# Patient Record
Sex: MALE | Race: WHITE | NOT HISPANIC OR LATINO | Employment: FULL TIME
[De-identification: names, ages, dates, MRNs, and addresses within clinical notes are randomized per-mention and may not be internally consistent; named-entity substitution may affect disease eponyms.]

---

## 2023-04-25 ENCOUNTER — TRANSCRIBE ORDERS (OUTPATIENT)
Dept: OTHER | Age: 43
End: 2023-04-25

## 2023-04-25 ENCOUNTER — TELEPHONE (OUTPATIENT)
Dept: TRANSPLANT | Facility: CLINIC | Age: 43
End: 2023-04-25

## 2023-04-25 DIAGNOSIS — C94.40 ACUTE PANMYELOSIS WITH MYELOFIBROSIS NOT HAVING ACHIEVED REMISSION (H): Primary | ICD-10-CM

## 2023-04-25 DIAGNOSIS — D75.81 MYELOFIBROSIS (H): Primary | ICD-10-CM

## 2023-05-21 ENCOUNTER — HEALTH MAINTENANCE LETTER (OUTPATIENT)
Age: 43
End: 2023-05-21

## 2023-05-23 ENCOUNTER — CARE COORDINATION (OUTPATIENT)
Dept: TRANSPLANT | Facility: CLINIC | Age: 43
End: 2023-05-23
Payer: COMMERCIAL

## 2023-05-23 DIAGNOSIS — C94.40 ACUTE PANMYELOSIS WITH MYELOFIBROSIS NOT HAVING ACHIEVED REMISSION (H): Primary | ICD-10-CM

## 2023-05-23 NOTE — PROGRESS NOTES
[facility]  RN Coordinator Pre-Visit Documentation      Venkatesh Green is a 42 year old adult who has been referred to the [facility] for hematopoietic cell transplant or immune effector cell therapy.      Referring MD Name: Suzan Ramirez, telephone [phone]    Reason for referral: allo HSCT    Link to BMT & CT Program Algorithms      For allos only:    Previous HLA typing? No    Previous formal donor search? No    PRA needed? Yes    CMV IGG needed? Yes    and ABO needed? Yes    Potential family donors to type? No, half-sister with muscle tissue disease    Need URD consents? Yes    For CAR-T Candidates Only:    Orders placed for virologies: Yes      All relevant clinical notes, labs, imaging, and pathology may be reviewed in Epic Bookmarks under name: Felisha Ariza RN      Patient Care Team    No active team members.           Felisha Ariza RN

## 2023-05-29 LAB
ABO/RH(D): NORMAL
ANTIBODY SCREEN: NEGATIVE
SPECIMEN EXPIRATION DATE: NORMAL

## 2023-05-30 ENCOUNTER — OFFICE VISIT (OUTPATIENT)
Dept: TRANSPLANT | Facility: CLINIC | Age: 43
End: 2023-05-30
Attending: INTERNAL MEDICINE
Payer: COMMERCIAL

## 2023-05-30 VITALS
DIASTOLIC BLOOD PRESSURE: 84 MMHG | BODY MASS INDEX: 49.44 KG/M2 | SYSTOLIC BLOOD PRESSURE: 117 MMHG | HEIGHT: 67 IN | TEMPERATURE: 98 F | OXYGEN SATURATION: 98 % | WEIGHT: 315 LBS | HEART RATE: 89 BPM | RESPIRATION RATE: 16 BRPM

## 2023-05-30 DIAGNOSIS — C94.40 ACUTE PANMYELOSIS WITH MYELOFIBROSIS NOT HAVING ACHIEVED REMISSION (H): ICD-10-CM

## 2023-05-30 DIAGNOSIS — Z71.9 VISIT FOR COUNSELING: Primary | ICD-10-CM

## 2023-05-30 DIAGNOSIS — C94.40 ACUTE PANMYELOSIS WITH MYELOFIBROSIS NOT HAVING ACHIEVED REMISSION (H): Primary | ICD-10-CM

## 2023-05-30 PROBLEM — D75.81 MYELOFIBROSIS (H): Status: ACTIVE | Noted: 2023-04-24

## 2023-05-30 LAB
CMV IGG SERPL IA-ACNC: 5.5 U/ML
CMV IGG SERPL IA-ACNC: ABNORMAL

## 2023-05-30 PROCEDURE — 86828 HLA CLASS I&II ANTIBODY QUAL: CPT | Performed by: INTERNAL MEDICINE

## 2023-05-30 PROCEDURE — 99417 PROLNG OP E/M EACH 15 MIN: CPT | Performed by: INTERNAL MEDICINE

## 2023-05-30 PROCEDURE — 81378 HLA I & II TYPING HR: CPT | Performed by: INTERNAL MEDICINE

## 2023-05-30 PROCEDURE — 86850 RBC ANTIBODY SCREEN: CPT | Performed by: INTERNAL MEDICINE

## 2023-05-30 PROCEDURE — 86644 CMV ANTIBODY: CPT | Performed by: INTERNAL MEDICINE

## 2023-05-30 PROCEDURE — 99213 OFFICE O/P EST LOW 20 MIN: CPT | Performed by: INTERNAL MEDICINE

## 2023-05-30 PROCEDURE — 99205 OFFICE O/P NEW HI 60 MIN: CPT | Performed by: INTERNAL MEDICINE

## 2023-05-30 PROCEDURE — 36415 COLL VENOUS BLD VENIPUNCTURE: CPT | Performed by: INTERNAL MEDICINE

## 2023-05-30 RX ORDER — HYDROXYUREA 500 MG/1
500 CAPSULE ORAL
COMMUNITY
Start: 2023-04-24 | End: 2024-04-26

## 2023-05-30 RX ORDER — DULOXETIN HYDROCHLORIDE 30 MG/1
CAPSULE, DELAYED RELEASE ORAL
COMMUNITY
Start: 2022-11-16

## 2023-05-30 RX ORDER — IBUPROFEN 200 MG
400 TABLET ORAL
COMMUNITY

## 2023-05-30 ASSESSMENT — PAIN SCALES - GENERAL: PAINLEVEL: NO PAIN (0)

## 2023-05-30 NOTE — NURSING NOTE
Patient labs drawn in clinic via venipuncture. Patient tolerated well and was discharged. Specimen(s) sent to first floor lab for processing. See flowsheet for details.      Sergey Moreno on 5/30/2023 at 10:04 AM

## 2023-05-30 NOTE — PROGRESS NOTES
BMT / Cell Therapy Consultation      Venkatesh Green is a 42 year old adult referred by Dr. Oconnor for post ET-mF.      Disease presentation and baseline characteristics:    Diagnosis: ?Post ET- vs. primary MF, Moderate reticulin fibrosis (MF-2). No increase in myeloblasts, positive for CALR mutation. NGS showed mutation in ASXL1 CARL2 and TET2.  Cytogenetics 46, XX. Flow cytometry showed no increased blasts.   GIPSS Intermediate 1, MIPSS70+ 4 (ASXL1 and constitutional symptoms)    Onc history:   42-year-old transgender female to male who was diagnosed in the past with essential thrombocythemia when he was about 17 or 18 years old. He said that he had a bone marrow biopsy [place] and he was told that he has essential thrombocythemia. He was on hydroxyurea for almost 4-5 years then anagrelide, but then after he left his parent's house he stopped taking it. He was off of the hydroxyurea as his counts were holding within the normal. Last time he saw Dr. Trujillo was back in 2017. By reviewing labs the patient had JAK2 V617F checked in the past and it was negative.     Recently he has been having pain in the left upper quadrant, fatigue extreme, night sweats for one year. He presented to the ED on April 3, 2023, with abdominal pain and he had CBC done which came back showing platelets 265,000, normal hemoglobin at 13.5 and normal WBC at 7.4.     He had a CT of the abdomen and pelvis which showed splenomegaly that measured about 20 cm and there was subcentimeter hypoattenuating lesion in the inferior right hepatic lobe that was felt to be most likely a small cyst. There was 1.9 cm peripherally calcified nodule medial to the left adrenal gland which has a benign appearance. There were no retroperitoneal or inguinal lymph nodes, and the patient was referred for consultation.     Bone marrow biopsy on April 11, 2023  A. PERIPHERAL BLOOD SMEAR MORPHOLOGY  --LEUKOERYTHROBLASTIC REACTION  WITH RARE CIRCULATING MYELOID-LINEAGE BLASTS     B. BONE MARROW ASPIRATE, CORE BIOPSY AND PARTICLE BLOCK  --MYELOPROLIFERATIVE NEOPLASM, MOST CONSISTENT WITH PRIMARY MYELOFIBROSIS, OVERT STAGE (SEE COMMENT)  --MODERATE RETICULIN FIBROSIS (MF-2)  --NO INCREASE IN MYELOBLASTS  --POSITIVE FOR CALR MUTATION  -- Normal immunophenotyping results.  No monotypic B-cell   population or increase in blasts identified.   -- Cytogenetics 46, XX. Flow cytometry showed no blasts seen  -- NGS:   1. ASXL1:  Chr20(GRCh37):g.[id number]>T;   NM_015338.5(ASXL1):c.4243C>T; p.Nfm1411* (27%)     2. CALR: Chr19(GRCh37):g.[id number]_13054625del;   NM_004343.3(CALR):c.1101_1152del; p.Bbf047Jmkrb*45 (56%)     3. TET2: Chr4(GRCh37):g.740603355ydc;   NM_001127208.2(TET2):c.685dup; p.Nhg390Xrbqd*25 (8%)     PNH:  ET, MF as above  Appendectomy  Overweight    FH:  Mother with history of blood clots, fibromyalgia COPD and dementia.  Has half a sister.  No known hematological malignancies in the family.    SH:  [employer] , 1/4 pack uyear for 13 years quit 2/2023, never heavy drinker was occasional drinker     Date Treatment Name Response Side Effects / Toxicities    Hydrea and anagrelide in the past.  Planned to start Jakafi.                              HPI:  Please see my entry above for hematologic history.    Patient is here for new transplant visit.  Reports mild intermittent left upper quadrant pain.  Severe fatigue for the past 2 years and intermittent not drenching night sweats for 1 year.  Denies any bleeding.  Denies any chest pain or shortness of breath.  Trying to be more physically active, has a 2-1/2-year-old son.    ASSESSMENT AND PLAN:  This is a pleasant 42-year-old male with history of ?Post ET- vs. primary MF, Moderate reticulin fibrosis (MF-2). No increase in myeloblasts, positive for CALR mutation. NGS showed mutation in ASXL1 CARL2 and TET2. Cytogenetics 46, XX.GIPSS Intermediate 1, MIPSS70+ 4 (ASXL1 and  constitutional symptoms), NORMAL counts, referred for consideration of consolidation with allogenic hematopoietic cell transplantation.     I discussed with the patient the implications and prognosis of his disease and the risks and benefits of proceeding with an allogeneic stem cell transplantation depending on the risk scores especially the molecular ones. At this time, he does not have high risk disease for indications of immediate transplant but we discussed that this will be in his future as the only potentially curative approach for the underlying myelofibrosis, and we further discussed the risk and benefit for transplant and leukemictransformation at the different risk stratification models.      I discussed with the patient the timeline of an allogeneic bone marrow transplant procedure, rationale, risks and benefits. After discharge, patients are required to live near the transplant center for frequent visits, lab tests, and medication adjustments for many months.  Patients must have caregiver support at home to help with self care. Side effects of the preparative regimen can include fatigue, nausea, vomiting, mouth sores, diarrhea, lung damage, liver damage, hair loss, and death.  Most of these are reversible, but occasionally, there are permanent.  Patients are usually infertile after transplant. There are long term risks of increased secondary cancers, including blood cancers as well.  There is also the possibility of engraftment failure. We discussed graft vs host disease as a short and long term risk that can affect many different parts of the body, including the skin, gut, and liver leading to rashes, mouth sores, diarrhea, nausea, jaundice.  This can be an acute or chronic complication and some patients will develop chronic graft versus host disease, and can be fatal.  Patients have to take immunosuppressive therapies possibly for many months after transplant. There are also risks of infection that  "occur both with the transplant and with immunosuppression.  Medications are used to help reduce the risk of severe infection.  We discussed that overall risks of mortality 30-40% to be further determined based on workup testing results.  We discussed risk of acute GVHD and chronic GVHD, that additionally depends on donor source, and that on our protocol with posttransplant cyclophosphamide as nfelc-jtkgni-mupn disease prophylaxis the results of 10% risk of severe acute vqsky-yjmhdz-eitl disease or immunosuppression requiring chronic rhfnh-zzdbbj-ugqo disease.    Recommendations:   - Start Jakafi locally for symptoms control  - MUD search today  - Re-assess risk vs benefit in 4-6 months with repeat BMB locally as well or earlier as clinically indicated.    I spent 90 minutes in the care of this patient today, which included time necessary for preparation for the visit, obtaining history, ordering medications/tests/procedures as medically indicated, review of pertinent medical literature, counseling of the patient, communication of recommendations to the care team, and documentation time.    Maricarmen Gaytan MD        ROS:    10 point ROS neg other than the symptoms noted above in the HPI.          Social History     Tobacco Use     Smoking status: Former     Types: Cigarettes     Smokeless tobacco: Never   Substance Use Topics     Alcohol use: Not Currently     Drug use: Never         No Known Allergies     Current Outpatient Medications   Medication Sig Dispense Refill     DULoxetine (CYMBALTA) 30 MG capsule        hydroxyurea (HYDREA) 500 MG capsule Take 500 mg by mouth       ibuprofen (ADVIL/MOTRIN) 200 MG tablet Take 400 mg by mouth           Physical Exam:     Vital Signs: /84 (BP Location: Right arm, Patient Position: Chair, Cuff Size: Adult Large)   Pulse 89   Temp 98  F (36.7  C) (Oral)   Resp 16   Ht 1.69 m (5' 6.54\")   Wt 148.6 kg (327 lb 11.2 oz)   SpO2 98%   BMI 52.04 kg/m          KPS:  " 80    Venkatesh understood the above assessment and recommendations.  Multiple questions answered.  No barriers to learning identified.      Known issues that I take into account for medical decisions, with salient changes to the plan considering these complexities noted above.    Patient Active Problem List   Diagnosis     Myelofibrosis (H)       ------------------------------------------------------------------------------------------------------------------------------------------------    Patient Care Team       Relationship Specialty Notifications Start End    Kristine Cruz CNP PCP - General   5/30/23     Phone: [phone] Fax: [phone]         [address] [place] [id number]

## 2023-05-30 NOTE — LETTER
5/30/2023         RE: Venkatesh Green  [address]  [address]  [place] [address]             BMT / Cell Therapy Consultation      Venkatesh Green is a 42 year old adult referred by Dr. Oconnor for post ET-mF.      Disease presentation and baseline characteristics:    Diagnosis: ?Post ET- vs. primary MF, Moderate reticulin fibrosis (MF-2). No increase in myeloblasts, positive for CALR mutation. NGS showed mutation in ASXL1 CARL2 and TET2.  Cytogenetics 46, XX. Flow cytometry showed no increased blasts.   GIPSS Intermediate 1, MIPSS70+ 4 (ASXL1 and constitutional symptoms)    Onc history:   42-year-old transgender female to male who was diagnosed in the past with essential thrombocythemia when he was about 17 or 18 years old. He said that he had a bone marrow biopsy done at that time in a different state and he was told that he has essential thrombocythemia. He was on hydroxyurea for almost 4-5 years then anagrelide, but then after he left his parent's house he stopped taking it. He was off of the hydroxyurea as his counts were holding within the normal. Last time he saw Dr. Trujillo was back in 2017. By reviewing labs the patient had JAK2 V617F checked in the past and it was negative.     Recently he has been having pain in the left upper quadrant, fatigue extreme, night sweats for one year. He presented to the ED on April 3, 2023, with abdominal pain and he had CBC done which came back showing platelets 265,000, normal hemoglobin at 13.5 and normal WBC at 7.4.     He had a CT of the abdomen and pelvis which showed splenomegaly that measured about 20 cm and there was subcentimeter hypoattenuating lesion in the inferior right hepatic lobe that was felt to be most likely a small cyst. There was 1.9 cm peripherally calcified nodule medial to the left adrenal gland which has a benign appearance. There were no retroperitoneal or inguinal lymph nodes, and the patient was referred for consultation.     Bone  marrow biopsy on April 11, 2023  A. PERIPHERAL BLOOD SMEAR MORPHOLOGY  --LEUKOERYTHROBLASTIC REACTION WITH RARE CIRCULATING MYELOID-LINEAGE BLASTS     B. BONE MARROW ASPIRATE, CORE BIOPSY AND PARTICLE BLOCK  --MYELOPROLIFERATIVE NEOPLASM, MOST CONSISTENT WITH PRIMARY MYELOFIBROSIS, OVERT STAGE (SEE COMMENT)  --MODERATE RETICULIN FIBROSIS (MF-2)  --NO INCREASE IN MYELOBLASTS  --POSITIVE FOR CALR MUTATION  -- Normal immunophenotyping results.  No monotypic B-cell   population or increase in blasts identified.   -- Cytogenetics 46, XX. Flow cytometry showed no blasts seen  -- NGS:   1. ASXL1:  Chr20(GRCh37):g.01829907E>T;   NM_015338.5(ASXL1):c.4243C>T; p.Hbm7678* (27%)     2. CALR: Chr19(GRCh37):g.13054574_13054625del;   NM_004343.3(CALR):c.1101_1152del; p.Etg703Kgtje*45 (56%)     3. TET2: Chr4(GRCh37):g.979026672egv;   NM_001127208.2(TET2):c.685dup; p.Lth291Vngfp*25 (8%)     PNH:  ET, MF as above  Appendectomy  Overweight    FH:  Mother with history of blood clots, fibromyalgia COPD and dementia.  Has half a sister.  No known hematological malignancies in the family.    SH:  [employer] , 1/4 pack uyear for 13 years quit 2/2023, never heavy drinker was occasional drinker     Date Treatment Name Response Side Effects / Toxicities    Hydrea and anagrelide in the past.  Planned to start Jakafi.                              HPI:  Please see my entry above for hematologic history.    Patient is here for new transplant visit.  Reports mild intermittent left upper quadrant pain.  Severe fatigue for the past 2 years and intermittent not drenching night sweats for 1 year.  Denies any bleeding.  Denies any chest pain or shortness of breath.  Trying to be more physically active, has a 2-1/2-year-old son.    ASSESSMENT AND PLAN:  This is a pleasant 42-year-old male with history of ?Post ET- vs. primary MF, Moderate reticulin fibrosis (MF-2). No increase in myeloblasts, positive for CALR mutation. NGS showed  mutation in ASXL1 CARL2 and TET2. Cytogenetics 46, XX.GIPSS Intermediate 1, MIPSS70+ 4 (ASXL1 and constitutional symptoms), NORMAL counts, referred for consideration of consolidation with allogenic hematopoietic cell transplantation.     I discussed with the patient the implications and prognosis of his disease and the risks and benefits of proceeding with an allogeneic stem cell transplantation depending on the risk scores especially the molecular ones. At this time, he does not have high risk disease for indications of immediate transplant but we discussed that this will be in his future as the only potentially curative approach for the underlying myelofibrosis, and we further discussed the risk and benefit for transplant and leukemictransformation at the different risk stratification models.      I discussed with the patient the timeline of an allogeneic bone marrow transplant procedure, rationale, risks and benefits. After discharge, patients are required to live near the transplant center for frequent visits, lab tests, and medication adjustments for many months.  Patients must have caregiver support at home to help with self care. Side effects of the preparative regimen can include fatigue, nausea, vomiting, mouth sores, diarrhea, lung damage, liver damage, hair loss, and death.  Most of these are reversible, but occasionally, there are permanent.  Patients are usually infertile after transplant. There are long term risks of increased secondary cancers, including blood cancers as well.  There is also the possibility of engraftment failure. We discussed graft vs host disease as a short and long term risk that can affect many different parts of the body, including the skin, gut, and liver leading to rashes, mouth sores, diarrhea, nausea, jaundice.  This can be an acute or chronic complication and some patients will develop chronic graft versus host disease, and can be fatal.  Patients have to take  immunosuppressive therapies possibly for many months after transplant. There are also risks of infection that occur both with the transplant and with immunosuppression.  Medications are used to help reduce the risk of severe infection.  We discussed that overall risks of mortality 30-40% to be further determined based on workup testing results.  We discussed risk of acute GVHD and chronic GVHD, that additionally depends on donor source, and that on our protocol with posttransplant cyclophosphamide as rbxhr-sxhoyx-hbvh disease prophylaxis the results of 10% risk of severe acute qdubx-mmfcgy-bxsa disease or immunosuppression requiring chronic kirlp-rvbfzl-fzns disease.    Recommendations:   - Start Jakafi locally for symptoms control  - MUD search today  - Re-assess risk vs benefit in 4-6 months with repeat BMB locally as well or earlier as clinically indicated.    I spent 90 minutes in the care of this patient today, which included time necessary for preparation for the visit, obtaining history, ordering medications/tests/procedures as medically indicated, review of pertinent medical literature, counseling of the patient, communication of recommendations to the care team, and documentation time.    Maricarmen Gaytan MD        ROS:    10 point ROS neg other than the symptoms noted above in the HPI.          Social History     Tobacco Use    Smoking status: Former     Types: Cigarettes    Smokeless tobacco: Never   Substance Use Topics    Alcohol use: Not Currently    Drug use: Never         No Known Allergies     Current Outpatient Medications   Medication Sig Dispense Refill    DULoxetine (CYMBALTA) 30 MG capsule       hydroxyurea (HYDREA) 500 MG capsule Take 500 mg by mouth      ibuprofen (ADVIL/MOTRIN) 200 MG tablet Take 400 mg by mouth           Physical Exam:     Vital Signs: /84 (BP Location: Right arm, Patient Position: Chair, Cuff Size: Adult Large)   Pulse 89   Temp 98  F (36.7  C) (Oral)   Resp 16    "Ht 1.69 m (5' 6.54\")   Wt 148.6 kg (327 lb 11.2 oz)   SpO2 98%   BMI 52.04 kg/m          KPS:  80    Venkatesh understood the above assessment and recommendations.  Multiple questions answered.  No barriers to learning identified.      Known issues that I take into account for medical decisions, with salient changes to the plan considering these complexities noted above.    Patient Active Problem List   Diagnosis    Myelofibrosis (H)       ------------------------------------------------------------------------------------------------------------------------------------------------    Patient Care Team         Relationship Specialty Notifications Start End    Kristine Cruz CNP PCP - General   5/30/23     Phone: [phone] Fax: [phone]         [address] [place] [id number]              Maricarmen Gaytan MD  "

## 2023-05-30 NOTE — NURSING NOTE
"Oncology Rooming Note    May 30, 2023 8:05 AM   Venkatesh Green is a 42 year old adult who presents for:    Chief Complaint   Patient presents with     Oncology Clinic Visit     [facility] RETURN - MYELOFIBROSIS     Initial Vitals: /84 (BP Location: Right arm, Patient Position: Chair, Cuff Size: Adult Large)   Pulse 89   Temp 98  F (36.7  C) (Oral)   Resp 16   Ht 1.69 m (5' 6.54\")   Wt 148.6 kg (327 lb 11.2 oz)   SpO2 98%   BMI 52.04 kg/m   Estimated body mass index is 52.04 kg/m  as calculated from the following:    Height as of this encounter: 1.69 m (5' 6.54\").    Weight as of this encounter: 148.6 kg (327 lb 11.2 oz). Body surface area is 2.64 meters squared.  No Pain (0) Comment: Data Unavailable   No LMP recorded.  Allergies reviewed: Yes  Medications reviewed: Yes    Medications: Medication refills not needed today.  Pharmacy name entered into [facility]: [facility] [place], [place] - [address]    Erwin Tellez LPN            "

## 2023-05-30 NOTE — LETTER
5/30/2023         RE: Venkatesh Green  [address] [facility] [address]  [place] [address]        Dear Colleague,    Thank you for referring your patient, Venkatesh Green, to the [facility] BLOOD AND MARROW TRANSPLANT PRO[facility] [place]. Please see a copy of my visit note below.           BMT / Cell Therapy Consultation      Venkatesh Green is a 42 year old adult referred by Dr. Oconnor for post ET-mF.      Disease presentation and baseline characteristics:    Diagnosis: ?Post ET- vs. primary MF, Moderate reticulin fibrosis (MF-2). No increase in myeloblasts, positive for CALR mutation. NGS showed mutation in ASXL1 CARL2 and TET2.  Cytogenetics 46, XX. Flow cytometry showed no increased blasts.   GIPSS Intermediate 1, MIPSS70+ 4 (ASXL1 and constitutional symptoms)    Onc history:   42-year-old transgender female to male who was diagnosed in the past with essential thrombocythemia when he was about 17 or 18 years old. He said that he had a bone marrow biopsy done at that time in a different state and he was told that he has essential thrombocythemia. He was on hydroxyurea for almost 4-5 years then anagrelide, but then after he left his parent's house he stopped taking it. He was off of the hydroxyurea as his counts were holding within the normal. Last time he saw Dr. Trujillo was back in 2017. By reviewing labs the patient had JAK2 V617F checked in the past and it was negative.     Recently he has been having pain in the left upper quadrant, fatigue extreme, night sweats for one year. He presented to the ED on April 3, 2023, with abdominal pain and he had CBC done which came back showing platelets 265,000, normal hemoglobin at 13.5 and normal WBC at 7.4.     He had a CT of the abdomen and pelvis which showed splenomegaly that measured about 20 cm and there was subcentimeter hypoattenuating lesion in the inferior right hepatic lobe that was felt to be most likely a small cyst. There was 1.9 cm  peripherally calcified nodule medial to the left adrenal gland which has a benign appearance. There were no retroperitoneal or inguinal lymph nodes, and the patient was referred for consultation.     Bone marrow biopsy on April 11, 2023  A. PERIPHERAL BLOOD SMEAR MORPHOLOGY  --LEUKOERYTHROBLASTIC REACTION WITH RARE CIRCULATING MYELOID-LINEAGE BLASTS     B. BONE MARROW ASPIRATE, CORE BIOPSY AND PARTICLE BLOCK  --MYELOPROLIFERATIVE NEOPLASM, MOST CONSISTENT WITH PRIMARY MYELOFIBROSIS, OVERT STAGE (SEE COMMENT)  --MODERATE RETICULIN FIBROSIS (MF-2)  --NO INCREASE IN MYELOBLASTS  --POSITIVE FOR CALR MUTATION  -- Normal immunophenotyping results.  No monotypic B-cell   population or increase in blasts identified.   -- Cytogenetics 46, XX. Flow cytometry showed no blasts seen  -- NGS:   1. ASXL1:  Chr20(GRCh37):g.[id number]>T;   NM_015338.5(ASXL1):c.4243C>T; p.Uqg1099* (27%)     2. CALR: Chr19(GRCh37):g.13054574_13054625del;   NM_004343.3(CALR):c.1101_1152del; p.Dwu247Nonnx*45 (56%)     3. TET2: Chr4(GRCh37):g.837996645muj;   NM_[id number](TET2):c.685dup; p.Oap828Xhkhc*25 (8%)     PNH:  ET, MF as above  Appendectomy  Overweight    FH:  Mother with history of blood clots, fibromyalgia COPD and dementia.  Has half a sister.  No known hematological malignancies in the family.    SH:  [employer] , 1/4 pack uyear for 13 years quit 2/2023, never heavy drinker was occasional drinker     Date Treatment Name Response Side Effects / Toxicities    Hydrea and anagrelide in the past.  Planned to start Jakafi.                              HPI:  Please see my entry above for hematologic history.    Patient is here for new transplant visit.  Reports mild intermittent left upper quadrant pain.  Severe fatigue for the past 2 years and intermittent not drenching night sweats for 1 year.  Denies any bleeding.  Denies any chest pain or shortness of breath.  Trying to be more physically active, has a 2-1/2-year-old  son.    ASSESSMENT AND PLAN:  This is a pleasant 42-year-old male with history of ?Post ET- vs. primary MF, Moderate reticulin fibrosis (MF-2). No increase in myeloblasts, positive for CALR mutation. NGS showed mutation in ASXL1 CARL2 and TET2. Cytogenetics 46, XX.GIPSS Intermediate 1, MIPSS70+ 4 (ASXL1 and constitutional symptoms), NORMAL counts, referred for consideration of consolidation with allogenic hematopoietic cell transplantation.     I discussed with the patient the implications and prognosis of his disease and the risks and benefits of proceeding with an allogeneic stem cell transplantation depending on the risk scores especially the molecular ones. At this time, he does not have high risk disease for indications of immediate transplant but we discussed that this will be in his future as the only potentially curative approach for the underlying myelofibrosis, and we further discussed the risk and benefit for transplant and leukemictransformation at the different risk stratification models.      I discussed with the patient the timeline of an allogeneic bone marrow transplant procedure, rationale, risks and benefits. After discharge, patients are required to live near the transplant center for frequent visits, lab tests, and medication adjustments for many months.  Patients must have caregiver support at home to help with self care. Side effects of the preparative regimen can include fatigue, nausea, vomiting, mouth sores, diarrhea, lung damage, liver damage, hair loss, and death.  Most of these are reversible, but occasionally, there are permanent.  Patients are usually infertile after transplant. There are long term risks of increased secondary cancers, including blood cancers as well.  There is also the possibility of engraftment failure. We discussed graft vs host disease as a short and long term risk that can affect many different parts of the body, including the skin, gut, and liver leading to  rashes, mouth sores, diarrhea, nausea, jaundice.  This can be an acute or chronic complication and some patients will develop chronic graft versus host disease, and can be fatal.  Patients have to take immunosuppressive therapies possibly for many months after transplant. There are also risks of infection that occur both with the transplant and with immunosuppression.  Medications are used to help reduce the risk of severe infection.  We discussed that overall risks of mortality 30-40% to be further determined based on workup testing results.  We discussed risk of acute GVHD and chronic GVHD, that additionally depends on donor source, and that on our protocol with posttransplant cyclophosphamide as zfynj-envqad-gmra disease prophylaxis the results of 10% risk of severe acute kesci-nwpxfa-crjd disease or immunosuppression requiring chronic ruvgv-wxgyms-sggj disease.    Recommendations:   - Start Jakafi locally for symptoms control  - MUD search today  - Re-assess risk vs benefit in 4-6 months with repeat BMB locally as well or earlier as clinically indicated.    I spent 90 minutes in the care of this patient today, which included time necessary for preparation for the visit, obtaining history, ordering medications/tests/procedures as medically indicated, review of pertinent medical literature, counseling of the patient, communication of recommendations to the care team, and documentation time.    Maricarmen Gaytan MD        ROS:    10 point ROS neg other than the symptoms noted above in the HPI.          Social History     Tobacco Use    Smoking status: Former     Types: Cigarettes    Smokeless tobacco: Never   Substance Use Topics    Alcohol use: Not Currently    Drug use: Never         No Known Allergies     Current Outpatient Medications   Medication Sig Dispense Refill    DULoxetine (CYMBALTA) 30 MG capsule       hydroxyurea (HYDREA) 500 MG capsule Take 500 mg by mouth      ibuprofen (ADVIL/MOTRIN) 200 MG tablet  "Take 400 mg by mouth           Physical Exam:     Vital Signs: /84 (BP Location: Right arm, Patient Position: Chair, Cuff Size: Adult Large)   Pulse 89   Temp 98  F (36.7  C) (Oral)   Resp 16   Ht 1.69 m (5' 6.54\")   Wt 148.6 kg (327 lb 11.2 oz)   SpO2 98%   BMI 52.04 kg/m          KPS:  80    Venkatesh understood the above assessment and recommendations.  Multiple questions answered.  No barriers to learning identified.      Known issues that I take into account for medical decisions, with salient changes to the plan considering these complexities noted above.    Patient Active Problem List   Diagnosis    Myelofibrosis (H)       ------------------------------------------------------------------------------------------------------------------------------------------------    Patient Care Team         Relationship Specialty Notifications Start End    Kristine Cruz CNP PCP - General   5/30/23     Phone: [phone] Fax: [phone]         [address] [place] [id number]              Maricarmen Gaytan MD    "

## 2023-05-30 NOTE — PROGRESS NOTES
"Blood and Marrow Transplant   New Transplant Visit with   Clinical     Assessment completed on 2023 in the BMT clinic. Information for this assessment was provided by pts report, consultation with medical team, and medical chart review.      Present:  Patient: Emanuel Green (\"Jane-ga-jessy\")  : TOI Oshea, [facility]    Medical Team   Nurse Coordinator: Brennen Stock/Felisha Ariza RN  BMT Physician: Maricarmen Gaytan MD  Referring Physician: Self-referred    Diagnosis: MFIB (Myelofibrosis)  Diagnosis Date: 2023    Presenting Information:  Pt is a 42 year old male diagnosed with MFIB . Pt was diagnosed on 2023. Pt presents for Allogeneic stem cell transplant discussion.    Contact Information:  Cell Phone: [phone]  Home Phone: [phone]    Special Needs:   No needs identified at this time.     Relocation Requirement:   Pt lives in [place], [place] (approximately 1 hour and 13 minutes from [facility] Pt will need to relocate and will need local lodging. SW discussed relocation and explained in detail the different lodging options. Pt in agreement with relocating.     Pt expressed interest in relocation to [facility] and will also explore staying with friend Nuzhat who lives in [place].     Living Situation:   Pt lives alone in [place], [place].  Pt has a 2.5 year old son Octavio who he shares with partner Antionette Asif.  Will shared that he is in the process of  from partner Antionette.      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    Insurance:   Pt asked questions regarding medication coverage and insurance benefits.  SW encouraged pt to contact insurance provider and human resources department at current company. " SW provided information regarding the insurance authorization process and the role of the [employer] . SW provided contact info for the [employer]  and referred pt to them for future insurance questions.     Finances:   Pt's source of income is salary/wages. Pt identified financial concern related to BMT including loss of salary/wages post transplant.  Pt also shared concern with the high cost of Jakafi and OOP costs.  Pt aware of financial assistance for medications and payment plans.  SW discussed lizzie options and asked pt to let SW know if they would like to apply in the future. Pt shared that his employer offered options to work remotely, for intermittent periods post transplant and will assist with setting up patient with FMLA.  Pt shared that he opted out of STD/LTD benefits but will explore opting back in during open enrollment.  Pt reports that his current monthly income is approximately $3800.  Pt noted that he believes that if he can cover his rent for 100 days he might be fine otherwise.    Caregiver:   SW discussed with the pt the caregiver role and expectation at length. Pt is agreeable to having a full time caregiver for the minimum of 100 days until cleared by the BMT Physician. Pt's identified caregivers are still being explored.  Patient expressed concerns with finding a caregiver for 100 days.  Pt shared that Antionette will be needing to care for their 2.5 year old while pt undergoes treatment/transplant.  Therefore, Antionette will not be able to be pts caregiver.  Pt shared that friend Nuzhat, who lives in [place], might be a possible caregiver and may allow pt to stay there post transplant for 100 days.  Pt also shared other possible caregivers including friends in [place] and aunt/cousins in [place].  Caregiver education and information provided. Caregiver concerns identified and possible caregivers explored.  Pt plan to explore various caregiver options.    Healthcare  Directive:  No. SW provided education and forms. SW encouraged pt to have discussions with their family regarding their health care wishes.  In the absence of a healthcare document, SW discussed the [place] Policy on who would make decisions on pts behalf if pt did not have the capacity to make healthcare decisions.  Pt acknowledged and aware.      Resources Provided:  -BMT Information Book  -BMT Resources Packet  -Healthcare Directive  -Honoring Choices - Your Rights: Making Your Own Health Care Treatment Decisions  -Caregiver Contract/Description  -Transplant Unit Description and Information   -Lodging Resources    Identified Concerns:  -Financial Concerns due to loss of income post transplant.  CSW encouraged pt to reach out for lizzie resources if desired.  -Caregiver Concerns-pt will explore caregiver options.  -Re-location requirement: Pt interested in [facility] and also exploring staying with Friend Nuzhat in [place].  Pt expressed concerns with finding a caregiver that would be able to stay with pt at [facility].    Summary:  Pt presents to [facility] regarding an Allogeneic stem cell transplant. Pt asked good/appropriate questions regarding psychosocial factors related to BMT; all questions were addressed. Pt presented as pleasant and appropriate. Pt's affect was full. Patient indicated they are currently feeling fine.     Plan:   SW provided contact information and encouraged pt to contact SW with any additional questions, concerns, resources and/or for support. SW will continue to follow pt to provide support and guidance with resources as needed.     TOI Oshea, SW  [facility]  Adult Blood & Marrow Transplant   Phone: [phone]  Pager: [phone]

## 2023-05-30 NOTE — PROGRESS NOTES
Spoke with Will, following new transplant visit with Dr. Theo Gaytan. Reviewed plan of care per NT conversation for Stem Cell Transplant. Explained role of the Nurse Coordinator throughout the BMT process as well as general time line and expectations for transplant. Discussed necessity of caregiver and program's proximity requirements. All questions were answered.     Plan: Allogeneic Transplant, pending continued therapy.    Timeline Notes: Bring to workup 6 months to a year.    Contact information provided for :  yes    Allo:    HLA typing drawn: Yes      PRA typing drawn:  Yes      CMV-IgG and ABO-Rh drawn or in record: Yes      Contact information provided for : Yes      Will sibling typing kits need to be sent? No      Financial Release for URD search obtained:  Yes    CAR-T:    Virologies drawn:  N/A    AUTO for MM:    Outpatient Infusion: N/A    EOC Reason updated: yes

## 2023-05-31 LAB
SCR 1 TEST METHOD: NORMAL
SCR1 CELL: NORMAL
SCR1 RESULT: NORMAL
SCR2 CELL: NORMAL
SCR2 RESULT: NORMAL
SCR2 TEST METHOD: NORMAL
ZZZSCR1 COMMENTS: NORMAL
ZZZSCR2 COMMENTS: NORMAL

## 2023-06-05 LAB
A*: NORMAL
A*LOCUS SEROLOGIC EQUIVALENT: 3
A*LOCUS: NORMAL
A*SEROLOGIC EQUIVALENT: 23
ABTEST METHOD: NORMAL
B*: NORMAL
B*LOCUS SEROLOGIC EQUIVALENT: 7
B*LOCUS: NORMAL
B*SEROLOGIC EQUIVALENT: 44
BW-1: NORMAL
BW-2: NORMAL
C*: NORMAL
C*LOCUS SEROLOGIC EQUIVALENT: 4
C*LOCUS: NORMAL
C*SEROLOGIC EQUIVALENT: 7
DPA1*: NORMAL
DPB1*: NORMAL
DPB1*LOCUS: NORMAL
DQA1*: NORMAL
DQA1*LOCUS: NORMAL
DQB1*: NORMAL
DQB1*LOCUS SEROLOGIC EQUIVALENT: 2
DQB1*LOCUS: NORMAL
DQB1*SEROLOGIC EQUIVALENT: 6
DRB1*: NORMAL
DRB1*LOCUS SEROLOGIC EQUIVALENT: 7
DRB1*LOCUS: NORMAL
DRB1*SEROLOGIC EQUIVALENT: 15
DRB4*LOCUS SEROLOGIC EQUIVALENT: 53
DRB4*LOCUS: NORMAL
DRB5*: NORMAL
DRB5*SEROLOGIC EQUIVALENT: 51
DRSSO TEST METHOD: NORMAL

## 2024-03-10 ENCOUNTER — HEALTH MAINTENANCE LETTER (OUTPATIENT)
Age: 44
End: 2024-03-10

## 2024-07-28 ENCOUNTER — HEALTH MAINTENANCE LETTER (OUTPATIENT)
Age: 44
End: 2024-07-28

## 2025-03-31 ENCOUNTER — MEDICAL CORRESPONDENCE (OUTPATIENT)
Dept: TRANSPLANT | Facility: CLINIC | Age: 45
End: 2025-03-31
Payer: COMMERCIAL

## 2025-03-31 ENCOUNTER — TELEPHONE (OUTPATIENT)
Dept: TRANSPLANT | Facility: CLINIC | Age: 45
End: 2025-03-31
Payer: COMMERCIAL

## 2025-05-29 ENCOUNTER — TELEPHONE (OUTPATIENT)
Dept: TRANSPLANT | Facility: CLINIC | Age: 45
End: 2025-05-29
Payer: COMMERCIAL

## 2025-07-17 ENCOUNTER — TELEPHONE (OUTPATIENT)
Dept: TRANSPLANT | Facility: CLINIC | Age: 45
End: 2025-07-17

## 2025-07-17 DIAGNOSIS — D75.81 MYELOFIBROSIS (H): Primary | ICD-10-CM

## 2025-07-21 ENCOUNTER — TRANSCRIBE ORDERS (OUTPATIENT)
Dept: OTHER | Age: 45
End: 2025-07-21

## 2025-07-21 ENCOUNTER — MEDICAL CORRESPONDENCE (OUTPATIENT)
Dept: HEALTH INFORMATION MANAGEMENT | Facility: CLINIC | Age: 45
End: 2025-07-21
Payer: MEDICAID

## 2025-07-21 DIAGNOSIS — R16.1 SPLENOMEGALY: ICD-10-CM

## 2025-07-21 DIAGNOSIS — D47.1 PRIMARY MYELOFIBROSIS (H): Primary | ICD-10-CM

## 2025-08-10 ENCOUNTER — HEALTH MAINTENANCE LETTER (OUTPATIENT)
Age: 45
End: 2025-08-10

## 2025-09-02 ENCOUNTER — CARE COORDINATION (OUTPATIENT)
Dept: TRANSPLANT | Facility: CLINIC | Age: 45
End: 2025-09-02
Payer: MEDICAID

## 2025-09-02 LAB
ABO + RH BLD: ABNORMAL
BLD GP AB SCN SERPL QL: POSITIVE
SPECIMEN EXP DATE BLD: ABNORMAL

## 2025-09-03 ENCOUNTER — ALLIED HEALTH/NURSE VISIT (OUTPATIENT)
Dept: TRANSPLANT | Facility: CLINIC | Age: 45
End: 2025-09-03
Payer: MEDICAID

## 2025-09-03 ENCOUNTER — LAB (OUTPATIENT)
Dept: LAB | Facility: CLINIC | Age: 45
End: 2025-09-03
Payer: MEDICAID

## 2025-09-03 ENCOUNTER — OFFICE VISIT (OUTPATIENT)
Dept: TRANSPLANT | Facility: CLINIC | Age: 45
End: 2025-09-03
Payer: MEDICAID

## 2025-09-03 VITALS
SYSTOLIC BLOOD PRESSURE: 106 MMHG | DIASTOLIC BLOOD PRESSURE: 74 MMHG | RESPIRATION RATE: 16 BRPM | OXYGEN SATURATION: 100 % | TEMPERATURE: 98.3 F | HEART RATE: 94 BPM | WEIGHT: 263.9 LBS | HEIGHT: 67 IN | BODY MASS INDEX: 41.42 KG/M2

## 2025-09-03 DIAGNOSIS — D75.81 MYELOFIBROSIS (H): Primary | ICD-10-CM

## 2025-09-03 DIAGNOSIS — Z71.9 VISIT FOR COUNSELING: Primary | ICD-10-CM

## 2025-09-03 DIAGNOSIS — D75.81 MYELOFIBROSIS (H): ICD-10-CM

## 2025-09-03 LAB
BLD GP AB INVEST PLASRBC-IMP: NORMAL
SPECIMEN EXP DATE BLD: NORMAL

## 2025-09-03 PROCEDURE — 86900 BLOOD TYPING SEROLOGIC ABO: CPT

## 2025-09-03 PROCEDURE — 86870 RBC ANTIBODY IDENTIFICATION: CPT

## 2025-09-03 PROCEDURE — 36415 COLL VENOUS BLD VENIPUNCTURE: CPT

## 2025-09-03 PROCEDURE — 86644 CMV ANTIBODY: CPT

## 2025-09-03 RX ORDER — ALLOPURINOL 300 MG/1
300 TABLET ORAL
COMMUNITY
Start: 2025-06-20

## 2025-09-03 RX ORDER — FOLIC ACID 1 MG/1
1 TABLET ORAL
COMMUNITY
Start: 2025-05-05 | End: 2026-05-05

## 2025-09-03 RX ORDER — OXYCODONE HYDROCHLORIDE 5 MG/1
5 TABLET ORAL
COMMUNITY
Start: 2025-08-26

## 2025-09-03 RX ORDER — LEVOFLOXACIN 500 MG/1
500 TABLET, FILM COATED ORAL
COMMUNITY
Start: 2025-08-29 | End: 2025-09-12

## 2025-09-03 RX ORDER — PROCHLORPERAZINE MALEATE 10 MG
10 TABLET ORAL
COMMUNITY

## 2025-09-03 RX ORDER — LIDOCAINE AND PRILOCAINE 25; 25 MG/G; MG/G
2.5 CREAM TOPICAL
COMMUNITY
Start: 2025-06-23 | End: 2026-06-23

## 2025-09-03 RX ORDER — CLOTRIMAZOLE AND BETAMETHASONE DIPROPIONATE 10; .64 MG/G; MG/G
CREAM TOPICAL
COMMUNITY

## 2025-09-03 ASSESSMENT — PAIN SCALES - GENERAL: PAINLEVEL_OUTOF10: MILD PAIN (1)

## 2025-09-04 LAB
CMV IGG SERPL IA-ACNC: 3.6 U/ML
CMV IGG SERPL IA-ACNC: ABNORMAL